# Patient Record
Sex: FEMALE | Race: WHITE | Employment: PART TIME | URBAN - METROPOLITAN AREA
[De-identification: names, ages, dates, MRNs, and addresses within clinical notes are randomized per-mention and may not be internally consistent; named-entity substitution may affect disease eponyms.]

---

## 2021-04-15 ENCOUNTER — OFFICE VISIT (OUTPATIENT)
Dept: PRIMARY CARE CLINIC | Age: 21
End: 2021-04-15
Payer: COMMERCIAL

## 2021-04-15 VITALS
SYSTOLIC BLOOD PRESSURE: 120 MMHG | TEMPERATURE: 98.6 F | BODY MASS INDEX: 21.85 KG/M2 | WEIGHT: 128 LBS | RESPIRATION RATE: 14 BRPM | HEART RATE: 77 BPM | OXYGEN SATURATION: 98 % | HEIGHT: 64 IN | DIASTOLIC BLOOD PRESSURE: 74 MMHG

## 2021-04-15 DIAGNOSIS — I88.1 LYMPHADENITIS, CHRONIC: Primary | ICD-10-CM

## 2021-04-15 DIAGNOSIS — R59.9 SWELLING OF LYMPH NODE: Chronic | ICD-10-CM

## 2021-04-15 PROCEDURE — 99203 OFFICE O/P NEW LOW 30 MIN: CPT | Performed by: INTERNAL MEDICINE

## 2021-04-15 RX ORDER — CEPHALEXIN 500 MG/1
500 CAPSULE ORAL 3 TIMES DAILY
Qty: 45 CAPSULE | Refills: 0 | Status: SHIPPED | OUTPATIENT
Start: 2021-04-15 | End: 2021-04-30

## 2021-04-15 SDOH — HEALTH STABILITY: MENTAL HEALTH: HOW OFTEN DO YOU HAVE A DRINK CONTAINING ALCOHOL?: NEVER

## 2021-04-15 SDOH — HEALTH STABILITY: MENTAL HEALTH: HOW MANY STANDARD DRINKS CONTAINING ALCOHOL DO YOU HAVE ON A TYPICAL DAY?: NOT ASKED

## 2021-04-15 SDOH — ECONOMIC STABILITY: FOOD INSECURITY: WITHIN THE PAST 12 MONTHS, THE FOOD YOU BOUGHT JUST DIDN'T LAST AND YOU DIDN'T HAVE MONEY TO GET MORE.: NEVER TRUE

## 2021-04-15 ASSESSMENT — PATIENT HEALTH QUESTIONNAIRE - PHQ9
SUM OF ALL RESPONSES TO PHQ9 QUESTIONS 1 & 2: 0
SUM OF ALL RESPONSES TO PHQ QUESTIONS 1-9: 0
SUM OF ALL RESPONSES TO PHQ QUESTIONS 1-9: 0
1. LITTLE INTEREST OR PLEASURE IN DOING THINGS: 0
2. FEELING DOWN, DEPRESSED OR HOPELESS: 0

## 2021-04-15 NOTE — PROGRESS NOTES
Subjective:      Patient ID: Lashae Fenton is a 24 y.o. female. HPI  Here for a NP establishment,  Swelling of lymph node  Has had a swollen gland on the rt side of the neck for 5 months,  Started with a sickness, with sore throat and runny nose and headache and s swollen face and fever which lasted for 2 weeks,  Was treated w steroids and antibiotics   The swollen LG is still there,  Some times worse than the other, some pain,   No other symptoms at this time. No rash or any infection. Will try 2 weeks of keflex ,   Call us if no better in 2-3 weeks . May need further evaluation. Excision and biopsy. Past Medical History:   Diagnosis Date    Anxiety     Depression        History reviewed. No pertinent surgical history. No current outpatient medications on file. No current facility-administered medications for this visit.         No Known Allergies    Social History     Socioeconomic History    Marital status: Single     Spouse name: Not on file    Number of children: Not on file    Years of education: Not on file    Highest education level: Not on file   Occupational History    Not on file   Social Needs    Financial resource strain: Not very hard    Food insecurity     Worry: Never true     Inability: Never true    Transportation needs     Medical: No     Non-medical: No   Tobacco Use    Smoking status: Never Smoker    Smokeless tobacco: Never Used   Substance and Sexual Activity    Alcohol use: Never     Frequency: Never     Binge frequency: Never    Drug use: Never    Sexual activity: Yes     Partners: Female   Lifestyle    Physical activity     Days per week: Not on file     Minutes per session: Not on file    Stress: Not on file   Relationships    Social connections     Talks on phone: Not on file     Gets together: Not on file     Attends Mandaeism service: Not on file     Active member of club or organization: Not on file     Attends meetings of clubs or organizations: Not on file     Relationship status: Not on file    Intimate partner violence     Fear of current or ex partner: Not on file     Emotionally abused: Not on file     Physically abused: Not on file     Forced sexual activity: Not on file   Other Topics Concern    Not on file   Social History Narrative    Not on file       Family History   Problem Relation Age of Onset    Diabetes Mother     High Blood Pressure Mother     Other Mother         Bipolar disorder    High Blood Pressure Father       Review of Systems  All the review of systems negative except above   Objective:   Physical Exam  /74 (Site: Left Upper Arm, Position: Sitting, Cuff Size: Medium Adult)   Pulse 77   Temp 98.6 °F (37 °C) (Skin)   Resp 14   Ht 5' 3.5\" (1.613 m)   Wt 128 lb (58.1 kg)   LMP 03/19/2021   SpO2 98%   BMI 22.32 kg/m²    The physical exam reveals a patient who appears well, alert and oriented x 3, pleasant, cooperative. Vitals are as noted. Head is atraumatic and normocephalic. Eyes reveal normal conjunctiva, cornea normal, pupils are equal and rective to light. Nasal mucosa is normal. Throat is normal without exudates. Ears reveal normal tympanic membranes. Neck is supple and free of adenopathy, or masses. No thyromegaly. No jugular venous distension. Lungs are clear to auscultation, no rales or rhonchi noted. Heart sounds are regular , no murmurs, clicks, gallops or rubs. Abdomen is soft, no tenderness, masses or organomegaly. Bowel sounds are normally heard. Pelvis: normal. Extremities are normal. Peripheral pulses are normal. Screening neurological exam is normal without focal findings. Cranial nerves are intact, reflexes are symmetrical and muscle strength eaqual. Skin is normal without suspicious lesions noted. Assessment:      Reactive lymphadenitis.        Plan:      Swelling of lymph node  Has had a swollen gland on the rt side of the neck for 5 months,  Started with a sickness, with sore throat and runny nose and headache and s swollen face and fever which lasted for 2 weeks,  Was treated w steroids and antibiotics   The swollen LG is still there,  Some times worse than the other, some pain,   No other symptoms at this time. No rash or any infection. Will try 2 weeks of keflex ,   Call us if no better in 2-3 weeks . May need further evaluation. Excision and biopsy.             Piero Robb MD

## 2021-04-15 NOTE — ASSESSMENT & PLAN NOTE
Has had a swollen gland on the rt side of the neck for 5 months,  Started with a sickness, with sore throat and runny nose and headache and s swollen face and fever which lasted for 2 weeks,  Was treated w steroids and antibiotics   The swollen LG is still there,  Some times worse than the other, some pain,   No other symptoms at this time. No rash or any infection. Will try 2 weeks of keflex ,   Call us if no better in 2-3 weeks . May need further evaluation. Excision and biopsy.

## 2021-07-09 ENCOUNTER — HOSPITAL ENCOUNTER (OUTPATIENT)
Dept: ULTRASOUND IMAGING | Age: 21
Discharge: HOME | End: 2021-07-09
Payer: COMMERCIAL

## 2021-07-09 DIAGNOSIS — R59.9 SWOLLEN LYMPH NODES: ICD-10-CM

## 2021-07-09 PROCEDURE — 76536 US EXAM OF HEAD AND NECK: CPT

## 2024-07-25 ENCOUNTER — APPOINTMENT (OUTPATIENT)
Dept: GENERAL RADIOLOGY | Age: 24
End: 2024-07-25
Payer: COMMERCIAL

## 2024-07-25 ENCOUNTER — APPOINTMENT (OUTPATIENT)
Dept: CT IMAGING | Age: 24
End: 2024-07-25
Payer: COMMERCIAL

## 2024-07-25 ENCOUNTER — HOSPITAL ENCOUNTER (EMERGENCY)
Age: 24
Discharge: HOME | End: 2024-07-25
Payer: COMMERCIAL

## 2024-07-25 VITALS
BODY MASS INDEX: 23.04 KG/M2 | OXYGEN SATURATION: 96 % | SYSTOLIC BLOOD PRESSURE: 113 MMHG | HEART RATE: 97 BPM | WEIGHT: 130 LBS | DIASTOLIC BLOOD PRESSURE: 73 MMHG | RESPIRATION RATE: 16 BRPM | HEIGHT: 63 IN | TEMPERATURE: 98.2 F

## 2024-07-25 DIAGNOSIS — J11.1 INFLUENZA WITH RESPIRATORY MANIFESTATION OTHER THAN PNEUMONIA: Primary | ICD-10-CM

## 2024-07-25 DIAGNOSIS — K52.9 GASTROENTERITIS: ICD-10-CM

## 2024-07-25 LAB
ALBUMIN SERPL-MCNC: 3.9 G/DL (ref 3.4–5)
ALBUMIN/GLOB SERPL: 1 {RATIO} (ref 1.1–2.2)
ALP SERPL-CCNC: 66 U/L (ref 40–129)
ALT SERPL-CCNC: 12 U/L (ref 10–40)
ANION GAP SERPL CALCULATED.3IONS-SCNC: 10 MMOL/L (ref 3–16)
AST SERPL-CCNC: 21 U/L (ref 15–37)
BACTERIA URNS QL MICRO: ABNORMAL /HPF
BASOPHILS # BLD: 0 K/UL (ref 0–0.2)
BASOPHILS NFR BLD: 0.3 %
BILIRUB SERPL-MCNC: 0.3 MG/DL (ref 0–1)
BILIRUB UR QL STRIP.AUTO: NEGATIVE
BUN SERPL-MCNC: 5 MG/DL (ref 7–20)
CALCIUM SERPL-MCNC: 8.8 MG/DL (ref 8.3–10.6)
CHLORIDE SERPL-SCNC: 96 MMOL/L (ref 99–110)
CLARITY UR: CLEAR
CO2 SERPL-SCNC: 24 MMOL/L (ref 21–32)
COLOR UR: YELLOW
CREAT SERPL-MCNC: 0.8 MG/DL (ref 0.6–1.1)
DEPRECATED RDW RBC AUTO: 13.6 % (ref 12.4–15.4)
EOSINOPHIL # BLD: 0 K/UL (ref 0–0.6)
EOSINOPHIL NFR BLD: 0 %
EPI CELLS #/AREA URNS HPF: ABNORMAL /HPF (ref 0–5)
GFR SERPLBLD CREATININE-BSD FMLA CKD-EPI: >90 ML/MIN/{1.73_M2}
GLUCOSE SERPL-MCNC: 115 MG/DL (ref 70–99)
GLUCOSE UR STRIP.AUTO-MCNC: NEGATIVE MG/DL
HCG SERPL QL: NEGATIVE
HCT VFR BLD AUTO: 36 % (ref 36–48)
HGB BLD-MCNC: 12.5 G/DL (ref 12–16)
HGB UR QL STRIP.AUTO: ABNORMAL
KETONES UR STRIP.AUTO-MCNC: NEGATIVE MG/DL
LACTATE BLDV-SCNC: 0.8 MMOL/L (ref 0.4–2)
LEUKOCYTE ESTERASE UR QL STRIP.AUTO: NEGATIVE
LIPASE SERPL-CCNC: 19 U/L (ref 13–60)
LYMPHOCYTES # BLD: 0.7 K/UL (ref 1–5.1)
LYMPHOCYTES NFR BLD: 9.9 %
MAGNESIUM SERPL-MCNC: 2.2 MG/DL (ref 1.8–2.4)
MCH RBC QN AUTO: 29 PG (ref 26–34)
MCHC RBC AUTO-ENTMCNC: 34.7 G/DL (ref 31–36)
MCV RBC AUTO: 83.6 FL (ref 80–100)
MONOCYTES # BLD: 0.7 K/UL (ref 0–1.3)
MONOCYTES NFR BLD: 9 %
NEUTROPHILS # BLD: 6.1 K/UL (ref 1.7–7.7)
NEUTROPHILS NFR BLD: 80.8 %
NITRITE UR QL STRIP.AUTO: NEGATIVE
PH UR STRIP.AUTO: 6.5 [PH] (ref 5–8)
PLATELET # BLD AUTO: 196 K/UL (ref 135–450)
PMV BLD AUTO: 7.9 FL (ref 5–10.5)
POTASSIUM SERPL-SCNC: 3.3 MMOL/L (ref 3.5–5.1)
PROT SERPL-MCNC: 7.8 G/DL (ref 6.4–8.2)
PROT UR STRIP.AUTO-MCNC: 30 MG/DL
RBC # BLD AUTO: 4.31 M/UL (ref 4–5.2)
RBC #/AREA URNS HPF: ABNORMAL /HPF (ref 0–4)
SODIUM SERPL-SCNC: 130 MMOL/L (ref 136–145)
SP GR UR STRIP.AUTO: 1.01 (ref 1–1.03)
UA COMPLETE W REFLEX CULTURE PNL UR: ABNORMAL
UA DIPSTICK W REFLEX MICRO PNL UR: YES
URN SPEC COLLECT METH UR: ABNORMAL
UROBILINOGEN UR STRIP-ACNC: 0.2 E.U./DL
WBC # BLD AUTO: 7.6 K/UL (ref 4–11)
WBC #/AREA URNS HPF: ABNORMAL /HPF (ref 0–5)

## 2024-07-25 PROCEDURE — 6360000002 HC RX W HCPCS: Performed by: PHYSICIAN ASSISTANT

## 2024-07-25 PROCEDURE — 2580000003 HC RX 258: Performed by: PHYSICIAN ASSISTANT

## 2024-07-25 PROCEDURE — 80053 COMPREHEN METABOLIC PANEL: CPT

## 2024-07-25 PROCEDURE — 83605 ASSAY OF LACTIC ACID: CPT

## 2024-07-25 PROCEDURE — 83690 ASSAY OF LIPASE: CPT

## 2024-07-25 PROCEDURE — 6360000004 HC RX CONTRAST MEDICATION: Performed by: PHYSICIAN ASSISTANT

## 2024-07-25 PROCEDURE — 83735 ASSAY OF MAGNESIUM: CPT

## 2024-07-25 PROCEDURE — 6370000000 HC RX 637 (ALT 250 FOR IP): Performed by: PHYSICIAN ASSISTANT

## 2024-07-25 PROCEDURE — 96374 THER/PROPH/DIAG INJ IV PUSH: CPT

## 2024-07-25 PROCEDURE — 74177 CT ABD & PELVIS W/CONTRAST: CPT

## 2024-07-25 PROCEDURE — 84703 CHORIONIC GONADOTROPIN ASSAY: CPT

## 2024-07-25 PROCEDURE — 71046 X-RAY EXAM CHEST 2 VIEWS: CPT

## 2024-07-25 PROCEDURE — 81001 URINALYSIS AUTO W/SCOPE: CPT

## 2024-07-25 PROCEDURE — 99285 EMERGENCY DEPT VISIT HI MDM: CPT

## 2024-07-25 PROCEDURE — 85025 COMPLETE CBC W/AUTO DIFF WBC: CPT

## 2024-07-25 PROCEDURE — 36415 COLL VENOUS BLD VENIPUNCTURE: CPT

## 2024-07-25 RX ORDER — AMOXICILLIN AND CLAVULANATE POTASSIUM 875; 125 MG/1; MG/1
1 TABLET, FILM COATED ORAL 2 TIMES DAILY
Qty: 20 TABLET | Refills: 0 | Status: SHIPPED | OUTPATIENT
Start: 2024-07-25 | End: 2024-08-04

## 2024-07-25 RX ORDER — AZITHROMYCIN 250 MG/1
TABLET, FILM COATED ORAL
Qty: 6 TABLET | Refills: 0 | Status: SHIPPED | OUTPATIENT
Start: 2024-07-25 | End: 2024-08-04

## 2024-07-25 RX ORDER — ACETAMINOPHEN 325 MG/1
650 TABLET ORAL ONCE
Status: COMPLETED | OUTPATIENT
Start: 2024-07-25 | End: 2024-07-25

## 2024-07-25 RX ORDER — ONDANSETRON 2 MG/ML
4 INJECTION INTRAMUSCULAR; INTRAVENOUS ONCE
Status: COMPLETED | OUTPATIENT
Start: 2024-07-25 | End: 2024-07-25

## 2024-07-25 RX ORDER — 0.9 % SODIUM CHLORIDE 0.9 %
1000 INTRAVENOUS SOLUTION INTRAVENOUS ONCE
Status: COMPLETED | OUTPATIENT
Start: 2024-07-25 | End: 2024-07-25

## 2024-07-25 RX ORDER — ONDANSETRON 4 MG/1
4 TABLET, FILM COATED ORAL 3 TIMES DAILY PRN
Qty: 15 TABLET | Refills: 0 | Status: SHIPPED | OUTPATIENT
Start: 2024-07-25

## 2024-07-25 RX ADMIN — ACETAMINOPHEN 650 MG: 325 TABLET ORAL at 12:49

## 2024-07-25 RX ADMIN — IOPAMIDOL 75 ML: 755 INJECTION, SOLUTION INTRAVENOUS at 13:51

## 2024-07-25 RX ADMIN — ONDANSETRON 4 MG: 2 INJECTION INTRAMUSCULAR; INTRAVENOUS at 12:47

## 2024-07-25 RX ADMIN — SODIUM CHLORIDE 1000 ML: 9 INJECTION, SOLUTION INTRAVENOUS at 12:48

## 2024-07-25 NOTE — ED PROVIDER NOTES
[facility] Emergency Department    CHIEF COMPLAINT  Fever (Pt reports fever with emesis and diarrhea for the past five days. Says she was seen at the [facility] on Sunday Had a negative covid test and flu test. Was told she has a virus. Pt reporting she continues to have these symptoms ), Emesis, and Diarrhea      SHARED SERVICE VISIT  Evaluated by DEAN.  My supervising physician was available for consultation.    HISTORY OF PRESENT ILLNESS  Michael Iraheta is a 23 y.o. female who presents to the ED complaining of coughing, sneezing, sore throat, fever, nausea and vomiting.  She has been experiencing symptoms ongoing for the past 5 days.  She was evaluated at [facility] on Sunday with negative COVID and flu testing.  She was told she had a viral illness and sent home.  Since then symptoms have persisted she began developing nausea and vomiting today.  Came in due to concerns of worsening infection.  Denies any vision changes or dizziness.  Denies any chest pain, shortness of breath, or dyspnea on exertion. Denies any urinary symptoms.  Denies any diarrhea or bloody stools.  Denies any new onset back pain.  Denies any recent travel or sick contacts.    No other complaints, modifying factors or associated symptoms.     Nursing notes reviewed.   Past Medical History:   Diagnosis Date    Anxiety     Depression      History reviewed. No pertinent surgical history.  Family History   Problem Relation Age of Onset    Diabetes Mother     High Blood Pressure Mother     Other Mother         Bipolar disorder    High Blood Pressure Father      Social History     Socioeconomic History    Marital status: Single     Spouse name: Not on file    Number of children: Not on file    Years of education: Not on file    Highest education level: Not on file   Occupational History    Not on file   Tobacco Use    Smoking status: Never    Smokeless tobacco: Never   Vaping Use    Vaping Use: Never used